# Patient Record
Sex: MALE | Race: BLACK OR AFRICAN AMERICAN | NOT HISPANIC OR LATINO | URBAN - NONMETROPOLITAN AREA
[De-identification: names, ages, dates, MRNs, and addresses within clinical notes are randomized per-mention and may not be internally consistent; named-entity substitution may affect disease eponyms.]

---

## 2019-12-12 NOTE — PATIENT DISCUSSION
TECH ONLY VISIT - PT TO RETURN TO DETERMINE CANDIDACY then CONFIRMATION OF MANIFEST/DILATION/CYCLO/DFE by Dr. Ely Denise

## 2022-11-03 ENCOUNTER — NEW PATIENT (OUTPATIENT)
Dept: URBAN - NONMETROPOLITAN AREA CLINIC 6 | Facility: CLINIC | Age: 37
End: 2022-11-03

## 2022-11-03 DIAGNOSIS — H40.1131: ICD-10-CM

## 2022-11-03 DIAGNOSIS — H25.13: ICD-10-CM

## 2022-11-03 DIAGNOSIS — E11.3522: ICD-10-CM

## 2022-11-03 DIAGNOSIS — E11.3591: ICD-10-CM

## 2022-11-03 PROCEDURE — 92004 COMPRE OPH EXAM NEW PT 1/>: CPT

## 2022-11-03 ASSESSMENT — KERATOMETRY
OS_K1POWER_DIOPTERS: 43.75
OD_AXISANGLE2_DEGREES: 97
OS_AXISANGLE_DEGREES: 176
OD_K1POWER_DIOPTERS: 43.50
OS_AXISANGLE2_DEGREES: 86
OD_K2POWER_DIOPTERS: 45.00
OD_AXISANGLE_DEGREES: 7
OS_K2POWER_DIOPTERS: 45.50

## 2022-11-03 ASSESSMENT — TONOMETRY
OD_IOP_MMHG: 25
OS_IOP_MMHG: 23

## 2022-11-03 ASSESSMENT — VISUAL ACUITY
OU_SC: CF 1FT
OS_SC: CF 1FT

## 2023-01-10 ENCOUNTER — CONSULTATION/EVALUATION (OUTPATIENT)
Dept: URBAN - NONMETROPOLITAN AREA CLINIC 6 | Facility: CLINIC | Age: 38
End: 2023-01-10

## 2023-01-10 DIAGNOSIS — H25.13: ICD-10-CM

## 2023-01-10 DIAGNOSIS — E11.3593: ICD-10-CM

## 2023-01-10 DIAGNOSIS — H33.43: ICD-10-CM

## 2023-01-10 PROCEDURE — 92014 COMPRE OPH EXAM EST PT 1/>: CPT

## 2023-01-10 PROCEDURE — 92134 CPTRZ OPH DX IMG PST SGM RTA: CPT

## 2023-01-10 ASSESSMENT — VISUAL ACUITY
OS_SC: 20/400
OS_PH: 20/300

## 2023-01-10 ASSESSMENT — TONOMETRY
OS_IOP_MMHG: 26
OD_IOP_MMHG: 25

## 2023-04-13 ENCOUNTER — POST-OP (OUTPATIENT)
Dept: URBAN - NONMETROPOLITAN AREA CLINIC 6 | Facility: CLINIC | Age: 38
End: 2023-04-13

## 2023-04-13 DIAGNOSIS — H25.12: ICD-10-CM

## 2023-04-13 DIAGNOSIS — Z96.1: ICD-10-CM

## 2023-04-13 PROCEDURE — 99024 POSTOP FOLLOW-UP VISIT: CPT

## 2023-04-13 RX ORDER — BRIMONIDINE TARTRATE, TIMOLOL MALEATE 2; 5 MG/ML; MG/ML: 1 SOLUTION/ DROPS OPHTHALMIC TWICE A DAY

## 2023-04-13 ASSESSMENT — VISUAL ACUITY: OS_SC: 20/30-2

## 2023-04-13 ASSESSMENT — TONOMETRY: OS_IOP_MMHG: 25

## 2023-04-26 ENCOUNTER — POST-OP (OUTPATIENT)
Dept: URBAN - NONMETROPOLITAN AREA CLINIC 6 | Facility: CLINIC | Age: 38
End: 2023-04-26

## 2023-04-26 DIAGNOSIS — Z96.1: ICD-10-CM

## 2023-04-26 PROCEDURE — 99024 POSTOP FOLLOW-UP VISIT: CPT

## 2023-04-26 ASSESSMENT — TONOMETRY
OD_IOP_MMHG: 15
OS_IOP_MMHG: 18
OD_IOP_MMHG: 17
OS_IOP_MMHG: 19

## 2023-04-26 ASSESSMENT — KERATOMETRY
OS_K1POWER_DIOPTERS: 43.75
OS_K2POWER_DIOPTERS: 45.25
OS_AXISANGLE2_DEGREES: 85
OS_AXISANGLE_DEGREES: 175

## 2023-04-26 ASSESSMENT — VISUAL ACUITY: OS_SC: 20/30

## 2023-05-16 ENCOUNTER — COMPREHENSIVE EXAM (OUTPATIENT)
Dept: URBAN - NONMETROPOLITAN AREA CLINIC 6 | Facility: CLINIC | Age: 38
End: 2023-05-16

## 2023-05-16 DIAGNOSIS — E11.3593: ICD-10-CM

## 2023-05-16 DIAGNOSIS — H33.43: ICD-10-CM

## 2023-05-16 DIAGNOSIS — H25.11: ICD-10-CM

## 2023-05-16 PROCEDURE — 92134 CPTRZ OPH DX IMG PST SGM RTA: CPT

## 2023-05-16 PROCEDURE — 92014 COMPRE OPH EXAM EST PT 1/>: CPT

## 2023-05-16 ASSESSMENT — VISUAL ACUITY: OS_CC: 20/30

## 2023-05-16 ASSESSMENT — TONOMETRY
OD_IOP_MMHG: 23
OS_IOP_MMHG: 21

## 2023-06-26 ENCOUNTER — EMERGENCY VISIT (OUTPATIENT)
Dept: URBAN - NONMETROPOLITAN AREA CLINIC 6 | Facility: CLINIC | Age: 38
End: 2023-06-26

## 2023-06-26 DIAGNOSIS — H33.43: ICD-10-CM

## 2023-06-26 DIAGNOSIS — E11.3593: ICD-10-CM

## 2023-06-26 DIAGNOSIS — Z96.1: ICD-10-CM

## 2023-06-26 PROCEDURE — 92014 COMPRE OPH EXAM EST PT 1/>: CPT

## 2023-06-26 ASSESSMENT — TONOMETRY
OD_IOP_MMHG: 22
OS_IOP_MMHG: 20

## 2023-06-26 ASSESSMENT — VISUAL ACUITY
OS_SC: 20/400
OU_SC: 20/400

## 2023-06-27 ENCOUNTER — EMERGENCY VISIT (OUTPATIENT)
Dept: URBAN - NONMETROPOLITAN AREA CLINIC 6 | Facility: CLINIC | Age: 38
End: 2023-06-27

## 2023-06-27 DIAGNOSIS — H43.12: ICD-10-CM

## 2023-06-27 DIAGNOSIS — H33.43: ICD-10-CM

## 2023-06-27 DIAGNOSIS — E11.3593: ICD-10-CM

## 2023-06-27 PROCEDURE — 92134 CPTRZ OPH DX IMG PST SGM RTA: CPT

## 2023-06-27 PROCEDURE — 92014 COMPRE OPH EXAM EST PT 1/>: CPT

## 2023-06-27 ASSESSMENT — VISUAL ACUITY
OS_SC: 20/80-1
OU_SC: 20/80-1

## 2023-06-27 ASSESSMENT — TONOMETRY
OS_IOP_MMHG: 26
OD_IOP_MMHG: 22

## 2024-08-08 ENCOUNTER — COMPREHENSIVE EXAM (OUTPATIENT)
Dept: URBAN - NONMETROPOLITAN AREA CLINIC 6 | Facility: CLINIC | Age: 39
End: 2024-08-08

## 2024-08-08 DIAGNOSIS — H25.11: ICD-10-CM

## 2024-08-08 DIAGNOSIS — H43.12: ICD-10-CM

## 2024-08-08 DIAGNOSIS — H33.43: ICD-10-CM

## 2024-08-08 DIAGNOSIS — E11.3593: ICD-10-CM

## 2024-08-08 DIAGNOSIS — Z79.4: ICD-10-CM

## 2024-08-08 DIAGNOSIS — H26.492: ICD-10-CM

## 2024-08-08 DIAGNOSIS — H40.1131: ICD-10-CM

## 2024-08-08 PROCEDURE — 66821 AFTER CATARACT LASER SURGERY: CPT

## 2024-08-08 PROCEDURE — 92015 DETERMINE REFRACTIVE STATE: CPT

## 2024-08-08 PROCEDURE — 92133 CPTRZD OPH DX IMG PST SGM ON: CPT | Mod: NC

## 2024-08-08 PROCEDURE — 92134 CPTRZ OPH DX IMG PST SGM RTA: CPT

## 2024-08-08 PROCEDURE — 92014 COMPRE OPH EXAM EST PT 1/>: CPT | Mod: 57

## 2024-08-08 ASSESSMENT — KERATOMETRY
OS_K1POWER_DIOPTERS: 43.75
OS_AXISANGLE_DEGREES: 172
OS_AXISANGLE2_DEGREES: 82
OD_AXISANGLE2_DEGREES: 93
OD_K1POWER_DIOPTERS: 43.75
OD_K2POWER_DIOPTERS: 45.75
OD_AXISANGLE_DEGREES: 3
OS_K2POWER_DIOPTERS: 45.25

## 2024-08-08 ASSESSMENT — VISUAL ACUITY
OS_SC: 20/50-2
OU_SC: 20/50-2

## 2024-08-08 ASSESSMENT — TONOMETRY
OS_IOP_MMHG: 19
OD_IOP_MMHG: 19